# Patient Record
Sex: MALE | Race: WHITE
[De-identification: names, ages, dates, MRNs, and addresses within clinical notes are randomized per-mention and may not be internally consistent; named-entity substitution may affect disease eponyms.]

---

## 2018-01-03 ENCOUNTER — HOSPITAL ENCOUNTER (OUTPATIENT)
Dept: HOSPITAL 89 - ZZGRANDUC | Age: 33
End: 2018-01-03
Attending: PHYSICIAN ASSISTANT
Payer: COMMERCIAL

## 2018-01-03 DIAGNOSIS — R19.7: Primary | ICD-10-CM

## 2018-01-03 LAB — PLATELET COUNT, AUTOMATED: 270 K/UL (ref 150–450)

## 2018-01-03 PROCEDURE — 87045 FECES CULTURE AEROBIC BACT: CPT

## 2018-01-03 PROCEDURE — 82947 ASSAY GLUCOSE BLOOD QUANT: CPT

## 2018-01-03 PROCEDURE — 85025 COMPLETE CBC W/AUTO DIFF WBC: CPT

## 2018-01-03 PROCEDURE — 82274 ASSAY TEST FOR BLOOD FECAL: CPT

## 2018-01-03 PROCEDURE — 84460 ALANINE AMINO (ALT) (SGPT): CPT

## 2018-01-03 PROCEDURE — 82247 BILIRUBIN TOTAL: CPT

## 2018-01-03 PROCEDURE — 82374 ASSAY BLOOD CARBON DIOXIDE: CPT

## 2018-01-03 PROCEDURE — 82435 ASSAY OF BLOOD CHLORIDE: CPT

## 2018-01-03 PROCEDURE — 84520 ASSAY OF UREA NITROGEN: CPT

## 2018-01-03 PROCEDURE — 87177 OVA AND PARASITES SMEARS: CPT

## 2018-01-03 PROCEDURE — 82040 ASSAY OF SERUM ALBUMIN: CPT

## 2018-01-03 PROCEDURE — 84295 ASSAY OF SERUM SODIUM: CPT

## 2018-01-03 PROCEDURE — 82310 ASSAY OF CALCIUM: CPT

## 2018-01-03 PROCEDURE — 84450 TRANSFERASE (AST) (SGOT): CPT

## 2018-01-03 PROCEDURE — 84075 ASSAY ALKALINE PHOSPHATASE: CPT

## 2018-01-03 PROCEDURE — 84155 ASSAY OF PROTEIN SERUM: CPT

## 2018-01-03 PROCEDURE — 84132 ASSAY OF SERUM POTASSIUM: CPT

## 2018-01-03 PROCEDURE — 82565 ASSAY OF CREATININE: CPT

## 2018-02-22 ENCOUNTER — HOSPITAL ENCOUNTER (OUTPATIENT)
Dept: HOSPITAL 89 - US | Age: 33
End: 2018-02-22
Attending: INTERNAL MEDICINE
Payer: COMMERCIAL

## 2018-02-22 DIAGNOSIS — K80.20: Primary | ICD-10-CM

## 2018-02-22 PROCEDURE — 76700 US EXAM ABDOM COMPLETE: CPT

## 2018-02-22 NOTE — RADIOLOGY IMAGING REPORT
FACILITY: [facility] 

 

PATIENT NAME: Shemar Rehman

: 1985

MR: [id number]

V: [id number]

EXAM DATE: 

ORDERING PHYSICIAN: LARRY MONCADA

TECHNOLOGIST: 

 

Location: [facility] [place]

Patient: Shemar Rehman

: 1985

MRN: [id number]

Visit/Account:[id number]

Date of Sevice:  2018

 

ACCESSION #: [id number]

 

EXAMINATION:   Abdominal ultrasound complete

 

HISTORY:   : Crohn's disease, sclerosing cholangitis

 

COMPARISON:   MR the abdomen 2016

 

FINDINGS:

 

Gallbladder: There are multiple mobile stones within the gallbladder.  The gallbladder wall does not 
appear thickened and measures 1.8 mm in thickness.  There is a negative Kerr sign by technologist miki corrigan.

 

Liver: Negative.

 

Common duct: Normal measuring 4.1 mm.

 

Pancreas: Negative.

 

Spleen:  Normal in size and echogenicity measuring 12.3 cm in length.

 

Kidneys:  Normal in size and echogenicity, the right measures 10.7 cm in length, and the left 11.2 cm
.  No hydronephrosis.

 

Upper abdominal aorta and IVC: Negative.

 

Ascites: None.

 

IMPRESSION:

Cholelithiasis although no evidence of biliary ductal dilatation gallbladder wall thickening or posit
catarina Kerr sign.

 

Report Dictated By: Lisa Danielson MD at 2018 10:49 AM

 

Report E-Signed By: Lisa Danielson MD  at 2018 10:51 AM

 

WSN:[id number]

## 2018-09-25 ENCOUNTER — HOSPITAL ENCOUNTER (OUTPATIENT)
Dept: HOSPITAL 89 - MRI | Age: 33
End: 2018-09-25
Attending: INTERNAL MEDICINE
Payer: COMMERCIAL

## 2018-09-25 DIAGNOSIS — K83.0: Primary | ICD-10-CM

## 2018-09-25 PROCEDURE — 74183 MRI ABD W/O CNTR FLWD CNTR: CPT

## 2018-09-25 NOTE — RADIOLOGY IMAGING REPORT
FACILITY: [facility] 

 

PATIENT NAME: Shemar Rehman

: 1985

MR: [id number]

V: [id number]

EXAM DATE: 

ORDERING PHYSICIAN: LARRY MONCADA

TECHNOLOGIST: 

 

Location: [facility]

Patient: Shemar Rehman

: 1985

MRN: [id number]

Visit/Account:[id number]

Date of Sevice:  2018

 

ACCESSION #: [id number]

 

ABDOMEN W W/O CONTRAST

 

HISTORY:  Primary sclerosing cholangitis

 

ADDITIONAL HISTORY:  None.

 

TECHNIQUE:  TECHNIQUE:  Multiplanar multisequence magnetic resonance imaging of the abdomen with and 
without intravenous contrast.

 

CONTRAST:  15 mL of MultiHance

 

COMPARISON:  MR the abdomen 2016

 

FINDINGS:

Visualized lung bases: Grossly unremarkable.

 

Liver: Negative.

 

Gallbladder: There are multiple layering gallstones

 

Bile ducts: The common bile duct measures approximately 5 mm in diameter becomes narrowed to 2 mm at 
the head of the pancreas.  Mild stricturing of the intrahepatic biliary tree and common hepatic duct 
are again noted.  No evidence of biliary ductal dilatation

 

Spleen: Negative.

 

Adrenal glands: Negative.

 

Pancreas: Negative.

 

Kidneys: Negative.

 

Vessels/spaces/nodes: No bulky adenopathy or ascities.

 

Visualized GI: Grossly unremarkable.

 

Bones/soft tissues: Unremarkable.

 

IMPRESSION:

 

Cholelithiasis although no evidence of biliary ductal dilatation

 

Mild stricturing of the intrahepatic biliary tree and common hepatic ducts similar to the prior MR.  
On bile duct measures 5 mm in diameter tapering down to 2 mm at the head the pancreas.  Findings are 
consistent with the clinical history of primary sclerosing cholangitis

 

Report Dictated By: Lisa Danielson MD at 2018 11:24 AM

 

Report E-Signed By: Lisa Danielson MD  at 2018 11:38 AM

 

WSN:[id number]

## 2018-11-08 ENCOUNTER — HOSPITAL ENCOUNTER (OUTPATIENT)
Dept: HOSPITAL 89 - ZZGRANDUC | Age: 33
End: 2018-11-08
Attending: NURSE PRACTITIONER
Payer: COMMERCIAL

## 2018-11-08 ENCOUNTER — HOSPITAL ENCOUNTER (OUTPATIENT)
Dept: HOSPITAL 89 - CT | Age: 33
End: 2018-11-08
Attending: NURSE PRACTITIONER
Payer: COMMERCIAL

## 2018-11-08 DIAGNOSIS — R10.9: Primary | ICD-10-CM

## 2018-11-08 DIAGNOSIS — K52.9: Primary | ICD-10-CM

## 2018-11-08 DIAGNOSIS — K80.20: ICD-10-CM

## 2018-11-08 LAB — PLATELET COUNT, AUTOMATED: 218 K/UL (ref 150–450)

## 2018-11-08 PROCEDURE — 82040 ASSAY OF SERUM ALBUMIN: CPT

## 2018-11-08 PROCEDURE — 82565 ASSAY OF CREATININE: CPT

## 2018-11-08 PROCEDURE — 84075 ASSAY ALKALINE PHOSPHATASE: CPT

## 2018-11-08 PROCEDURE — 82435 ASSAY OF BLOOD CHLORIDE: CPT

## 2018-11-08 PROCEDURE — 85025 COMPLETE CBC W/AUTO DIFF WBC: CPT

## 2018-11-08 PROCEDURE — 84520 ASSAY OF UREA NITROGEN: CPT

## 2018-11-08 PROCEDURE — 82947 ASSAY GLUCOSE BLOOD QUANT: CPT

## 2018-11-08 PROCEDURE — 84295 ASSAY OF SERUM SODIUM: CPT

## 2018-11-08 PROCEDURE — 74177 CT ABD & PELVIS W/CONTRAST: CPT

## 2018-11-08 PROCEDURE — 82310 ASSAY OF CALCIUM: CPT

## 2018-11-08 PROCEDURE — 84450 TRANSFERASE (AST) (SGOT): CPT

## 2018-11-08 PROCEDURE — 84460 ALANINE AMINO (ALT) (SGPT): CPT

## 2018-11-08 PROCEDURE — 84155 ASSAY OF PROTEIN SERUM: CPT

## 2018-11-08 PROCEDURE — 82374 ASSAY BLOOD CARBON DIOXIDE: CPT

## 2018-11-08 PROCEDURE — 82247 BILIRUBIN TOTAL: CPT

## 2018-11-08 PROCEDURE — 84132 ASSAY OF SERUM POTASSIUM: CPT

## 2018-11-08 NOTE — RADIOLOGY IMAGING REPORT
[place] [facility] [place] 

 

PATIENT NAME: Shemar Rehman

: 1985

MR: [id number]

V: [id number]

EXAM DATE: 

ORDERING PHYSICIAN: ZENA PEREA

TECHNOLOGIST: 

 

Location: [facility]

Patient: Shemar Rehman

: 1985

MRN: [id number]

Visit/Account:[id number]

Date of Sevice: 2018

 

ACCESSION #: [id number]

 

CT abdomen and pelvis with IV contrast

 

Indication: Abdominal pain.

 

Comparison:   MR abdomen 2018..

 

Technique:   Axial CT images were obtained through the abdomen and pelvis during injection of nonioni
c iodinated intravenous contrast. Reformatted coronal and sagittal images were also obtained.

One of the following dose optimization techniques was utilized in the performance of this exam: Autom
ated exposure control; adjustment of the mA and/or kV according to the patient's size; or use of an i
terative  reconstruction technique.  Specific details can be referenced in the facility's radiology C
T exam operational policy.

Contrast:   75 ml of Isovue-370  IV contrast.

 

Findings:

Lower lung fields: Small focal area of hazy interstitial opacities seen in the lateral aspect of the 
left lower lobe. Lung bases are clear.

 

Liver: No focal parenchymal abnormality of the liver.

Biliary: There are couple small gallstones again seen in the gallbladder without other gallbladder ab
normality. The below system is unremarkable and not as well-seen as on the previous MRI.

Pancreas: No focal abnormality.

Spleen: No focal abnormality.

Adrenal glands: Unremarkable.

Kidneys / retroperitoneum: No evidence of nephrolithiasis or hydronephrosis. No focal abnormality.

 

 

Bowel / peritoneum / mesenteries: Diffuse colonic wall thickening more prominent in the cecum and asc
ending region of the colon without focal abnormality. Mild inflammatory changes seen around the cecum
 and ascending colon. The small bowel shows no focal abnormality or obstruction. The appendix is norm
al. The stomach is mainly decompressed and grossly normal.

Tiny bit of free fluid seen in the pelvis. No fluid collections, free air or other areas of inflammat
ion.

 

Lymph node assessment: No pathologic adenopathy identified.

 

Pelvic  structures: Appear unremarkable.

 

 

Vessels: No significant atherosclerotic calcifications seen throughout a nonaneurysmal abdominal aort
a and branches.

 

Musculoskeletal / Body wall: No acute or aggressive osseous abnormality.

 

 

 

IMPRESSION:

1. Diffuse colitis but more prominent in the cecum and ascending region. No focal abnormality or obst
ruction. Consideration to follow-up colonoscopy after resolution acute processes to assess for any un
derlying abnormality.

2. Continued cholelithiasis. No indication of cholecystitis.

3. The anterolateral left lower lobe does show small focal area of interstitial opacity. This could r
epresent a early pneumonitis.

 

 

I called report to ZENA PEREA at 2018 3:33 PM.

 

 

Report Dictated By: Marco A Griffith at 2018 3:24 PM

 

Report E-Signed By: Marco A Griffith  at 2018 3:34 PM

 

WSN:[id number]

## 2019-04-26 ENCOUNTER — HOSPITAL ENCOUNTER (OUTPATIENT)
Dept: HOSPITAL 89 - US | Age: 34
End: 2019-04-26
Attending: INTERNAL MEDICINE
Payer: COMMERCIAL

## 2019-04-26 DIAGNOSIS — K80.20: Primary | ICD-10-CM

## 2019-04-26 PROCEDURE — 76700 US EXAM ABDOM COMPLETE: CPT

## 2019-04-26 NOTE — RADIOLOGY IMAGING REPORT
FACILITY: [facility] 

 

PATIENT NAME: Shemar Rehman

: 1985

MR: [id number]

V: [id number]

EXAM DATE: 

ORDERING PHYSICIAN: FRIEDA POLLACK

TECHNOLOGIST: 

 

Location: [facility] [place]

Patient: Shemar Rehman

: 1985

MRN: [id number]

Visit/Account:[id number]

Date of Sevice:  2019

 

ACCESSION #: [id number]

 

EXAMINATION:   Abdominal ultrasound complete

 

HISTORY:   Primary sclerosing cholangitis

 

COMPARISON:   2018

 

FINDINGS:

 

Gallbladder: There multiple gallstones present although no evidence of a positive Kerr sign, or gal
lbladder wall thickening

 

Liver: Negative.

 

Common duct: Minimally prominent at 5.6 mm

 

Pancreas: Unremarkable as imaged

 

Spleen:  Normal echogenicity although spleen is minimally prominent measuring 12.5 cm in length.

 

Kidneys:  Normal in size and echogenicity, the right measures 11.4 cm in length, and the left 10.3 cm
.  No hydronephrosis.

 

Upper abdominal aorta and IVC: Negative.

 

Ascites: None.

 

IMPRESSION:

Cholelithiasis with a negative Kerr sign.  There is minimal biliary ductal dilatation of 5.6 mm

 

Spleen is mildly prominent

 

Report Dictated By: Lisa Danieslon MD at 2019 10:30 AM

 

Report E-Signed By: Lisa Danielson MD  at 2019 10:34 AM

 

TOSHIAN:[id number]